# Patient Record
Sex: MALE | Race: WHITE
[De-identification: names, ages, dates, MRNs, and addresses within clinical notes are randomized per-mention and may not be internally consistent; named-entity substitution may affect disease eponyms.]

---
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
## 2019-04-23 ENCOUNTER — HOSPITAL ENCOUNTER (OUTPATIENT)
Dept: HOSPITAL 89 - RAD | Age: 19
End: 2019-04-23
Attending: NURSE PRACTITIONER
Payer: COMMERCIAL

## 2019-04-23 DIAGNOSIS — M25.572: Primary | ICD-10-CM

## 2019-04-23 NOTE — RADIOLOGY IMAGING REPORT
FACILITY: [facility] 

 

PATIENT NAME: Ameya Gerber

: 2000

MR: [id number]

V: [id number]

EXAM DATE: [id number]

ORDERING PHYSICIAN: TRISTEN DOUGLAS

TECHNOLOGIST: 

 

Location: [facility]

Patient: Ameya Gerber

: 2000

MRN: [id number]

Visit/Account:[id number]

Date of Sevice:  2019

 

ACCESSION #: [id number]

 

Exam type: ANKLE 3 VIEW MIN LEFT

 

History: Left lateral ankle/foot pain, no known injury

 

Comparison: None.

 

Findings:

 

There is no evidence of acute fracture dislocation or significant arthritic change involving the left
 ankle.  No lytic or blastic bone lesions are seen.

 

IMPRESSION:

 

1.  No acute osteoarticular abnormality the left ankle is seen

 

Report Dictated By: Michelle Park MD at 2019 4:53 PM

 

Report E-Signed By: Michelle Park MD  at 2019 4:54 PM

 

WSN:[id number]